# Patient Record
Sex: FEMALE | URBAN - METROPOLITAN AREA
[De-identification: names, ages, dates, MRNs, and addresses within clinical notes are randomized per-mention and may not be internally consistent; named-entity substitution may affect disease eponyms.]

---

## 2022-12-05 ENCOUNTER — OFFICE VISIT (OUTPATIENT)
Dept: URBAN - METROPOLITAN AREA CLINIC 107 | Facility: CLINIC | Age: 22
End: 2022-12-05
Payer: COMMERCIAL

## 2022-12-05 VITALS
SYSTOLIC BLOOD PRESSURE: 97 MMHG | BODY MASS INDEX: 29.44 KG/M2 | TEMPERATURE: 97.1 F | RESPIRATION RATE: 20 BRPM | WEIGHT: 160 LBS | HEART RATE: 80 BPM | HEIGHT: 62 IN | DIASTOLIC BLOOD PRESSURE: 61 MMHG

## 2022-12-05 DIAGNOSIS — K62.5 BLOOD PER RECTUM: ICD-10-CM

## 2022-12-05 PROCEDURE — 99203 OFFICE O/P NEW LOW 30 MIN: CPT | Performed by: NURSE PRACTITIONER

## 2022-12-05 RX ORDER — DEXTROAMPHETAMINE SACCHARATE, AMPHETAMINE ASPARTATE MONOHYDRATE, DEXTROAMPHETAMINE SULFATE, AND AMPHETAMINE SULFATE 7.5; 7.5; 7.5; 7.5 MG/1; MG/1; MG/1; MG/1
1 CAPSULE IN THE MORNING CAPSULE ORAL ONCE A DAY
Status: ACTIVE | COMMUNITY

## 2022-12-05 RX ORDER — SALICYLIC ACID 3 G/100G
1 TABLET SWALLOW WHOLE WITH WATER; DO NOT TAKE WITH FRUIT JUICES LOTION TOPICAL ONCE A DAY
Status: ACTIVE | COMMUNITY

## 2022-12-05 RX ORDER — NORELGESTROMIN AND ETHINLY ESTRADIOL 150; 35 UG/D; UG/D
AS DIRECTED PATCH TRANSDERMAL
Status: ACTIVE | COMMUNITY

## 2022-12-05 RX ORDER — FLUOXETINE 10 MG/1
1 CAPSULE CAPSULE ORAL ONCE A DAY
Status: ACTIVE | COMMUNITY

## 2022-12-05 NOTE — HPI-TODAY'S VISIT:
This is a 22-year-old woman presenting to discuss blood per rectum and possible hemorrhoids.  About a month ago, she had an isolated episode of blood per rectum associated with straining and hard stools, assumed to be related to a hemorrhoid. She has had a colonoscopy in 2020 by Dr. White for evaluation of blood per rectum, but never learned the results.  She was started on mesalamine for unclear reasons. She suffered side effects of "stomach ache and dark urine" from the mesalamine. She weaned herself off of this, and has done well since this time until the episode of bleeding one month ago.   There is no dysphaiga, heartburn, nausea or vomiting. She is not having any abdominal pain. She has bowel movement two to three times per week. Stool is normally a brown color. There is generally not any red blood mixed in the with stool. During this most recent episode of bleeding, blood appeared to coat the stool, wsa in the commode and small volume on the tissue. There is no proctalgia.

## 2023-01-09 ENCOUNTER — OFFICE VISIT (OUTPATIENT)
Dept: URBAN - METROPOLITAN AREA CLINIC 107 | Facility: CLINIC | Age: 23
End: 2023-01-09

## 2023-01-11 ENCOUNTER — DASHBOARD ENCOUNTERS (OUTPATIENT)
Age: 23
End: 2023-01-11

## 2023-01-11 ENCOUNTER — OFFICE VISIT (OUTPATIENT)
Dept: URBAN - METROPOLITAN AREA CLINIC 113 | Facility: CLINIC | Age: 23
End: 2023-01-11
Payer: COMMERCIAL

## 2023-01-11 VITALS
DIASTOLIC BLOOD PRESSURE: 81 MMHG | SYSTOLIC BLOOD PRESSURE: 123 MMHG | WEIGHT: 120 LBS | RESPIRATION RATE: 16 BRPM | HEART RATE: 97 BPM | TEMPERATURE: 97.7 F | BODY MASS INDEX: 22.08 KG/M2 | HEIGHT: 62 IN

## 2023-01-11 DIAGNOSIS — K62.5 BLOOD PER RECTUM: ICD-10-CM

## 2023-01-11 PROCEDURE — 99213 OFFICE O/P EST LOW 20 MIN: CPT | Performed by: NURSE PRACTITIONER

## 2023-01-11 RX ORDER — DEXTROAMPHETAMINE SACCHARATE, AMPHETAMINE ASPARTATE MONOHYDRATE, DEXTROAMPHETAMINE SULFATE, AND AMPHETAMINE SULFATE 7.5; 7.5; 7.5; 7.5 MG/1; MG/1; MG/1; MG/1
1 CAPSULE IN THE MORNING CAPSULE ORAL ONCE A DAY
Status: ACTIVE | COMMUNITY

## 2023-01-11 RX ORDER — SERTRALINE HYDROCHLORIDE 25 MG/1
1 TABLET TABLET, FILM COATED ORAL ONCE A DAY
Status: ACTIVE | COMMUNITY

## 2023-01-11 RX ORDER — SALICYLIC ACID 3 G/100G
1 TABLET SWALLOW WHOLE WITH WATER; DO NOT TAKE WITH FRUIT JUICES LOTION TOPICAL ONCE A DAY
Status: ACTIVE | COMMUNITY

## 2023-01-11 RX ORDER — NORELGESTROMIN AND ETHINLY ESTRADIOL 150; 35 UG/D; UG/D
AS DIRECTED PATCH TRANSDERMAL
Status: ACTIVE | COMMUNITY

## 2023-01-11 RX ORDER — FLUOXETINE 10 MG/1
1 CAPSULE CAPSULE ORAL ONCE A DAY
Status: ACTIVE | COMMUNITY

## 2023-01-11 NOTE — HPI-TODAY'S VISIT:
This is a 22-year-old female presenting for follow-up for blood per rectum. She was initially seen in the office on 12/5/2022 with complaints of episodic blood per rectum, small in volume, suspected to be related to benign anorectal pathology such as a hemorrhoid.  She reported having a colonoscopy in 2020 with Dr. White.  I requested records for review.  Bleeding had resolved at the time of the visit.  Repeat colonoscopy was a consideration.  She was recommended daily fiber supplementation.  She is no longer having any rectal bleeding. Her bowels are moving every other day. There is no straining. There is no abdominal pain. Asymptomatic from a GI standpoint.